# Patient Record
Sex: FEMALE | Race: WHITE | NOT HISPANIC OR LATINO | URBAN - METROPOLITAN AREA
[De-identification: names, ages, dates, MRNs, and addresses within clinical notes are randomized per-mention and may not be internally consistent; named-entity substitution may affect disease eponyms.]

---

## 2020-11-03 ENCOUNTER — HISTORICAL (OUTPATIENT)
Dept: RADIOLOGY | Facility: HOSPITAL | Age: 40
End: 2020-11-03

## 2021-04-26 LAB
ABS NEUT (OLG): 2.68 X10(3)/MCL (ref 2.1–9.2)
ANISOCYTOSIS BLD QL SMEAR: 1
B-HCG FREE SERPL-ACNC: <2.42 MIU/ML
BASOPHILS # BLD AUTO: 0 X10(3)/MCL (ref 0–0.2)
BASOPHILS NFR BLD AUTO: 1 %
DACRYOCYTES BLD QL SMEAR: 1 /MCL (ref 0–1)
EOSINOPHIL # BLD AUTO: 0.2 X10(3)/MCL (ref 0–0.9)
EOSINOPHIL NFR BLD AUTO: 4 %
ERYTHROCYTE [DISTWIDTH] IN BLOOD BY AUTOMATED COUNT: 21 % (ref 11.5–17)
GROUP & RH: NORMAL
HCT VFR BLD AUTO: 26.3 % (ref 37–47)
HGB BLD-MCNC: 7 GM/DL (ref 12–16)
HYPOCHROMIA BLD QL SMEAR: 1
IMM GRANULOCYTES # BLD AUTO: 0.01 10*3/UL
IMM GRANULOCYTES NFR BLD AUTO: 0 %
LYMPHOCYTES # BLD AUTO: 1.1 X10(3)/MCL (ref 0.6–4.6)
LYMPHOCYTES NFR BLD AUTO: 25 %
MACROCYTES BLD QL SMEAR: 1 /MCL
MCH RBC QN AUTO: 17.3 PG (ref 27–31)
MCHC RBC AUTO-ENTMCNC: 26.6 GM/DL (ref 33–36)
MCV RBC AUTO: 65.1 FL (ref 80–94)
MICROCYTES BLD QL SMEAR: 1
MONOCYTES # BLD AUTO: 0.4 X10(3)/MCL (ref 0.1–1.3)
MONOCYTES NFR BLD AUTO: 10 %
NEUTROPHILS # BLD AUTO: 2.68 X10(3)/MCL (ref 2.1–9.2)
NEUTROPHILS NFR BLD AUTO: 60 %
OVALOCYTES BLD QL SMEAR: 1 (ref 0–0)
PLATELET # BLD AUTO: 302 X10(3)/MCL (ref 130–400)
PLATELET # BLD EST: ADEQUATE 10*3/UL
PMV BLD AUTO: ABNORMAL FL (ref 9.4–12.4)
POIKILOCYTOSIS BLD QL SMEAR: 1
RBC # BLD AUTO: 4.04 X10(6)/MCL (ref 4.2–5.4)
RBC MORPH BLD: ABNORMAL
SARS-COV-2 AG RESP QL IA.RAPID: NEGATIVE
WBC # SPEC AUTO: 4.4 X10(3)/MCL (ref 4.5–11.5)

## 2021-04-27 ENCOUNTER — HISTORICAL (OUTPATIENT)
Dept: INFUSION THERAPY | Facility: HOSPITAL | Age: 41
End: 2021-04-27

## 2021-04-27 LAB
CROSSMATCH INTERPRETATION: NORMAL
GROUP & RH: NORMAL
PRODUCT READY: NORMAL
TRANSFUSION ORDER: NORMAL

## 2021-04-28 ENCOUNTER — HISTORICAL (OUTPATIENT)
Dept: SURGERY | Facility: HOSPITAL | Age: 41
End: 2021-04-28

## 2021-04-28 LAB
ANION GAP SERPL CALC-SCNC: 13 MMOL/L
BUN SERPL-MCNC: 8 MG/DL (ref 8–26)
CHLORIDE SERPL-SCNC: 103 MMOL/L (ref 98–109)
CREAT SERPL-MCNC: 0.7 MG/DL (ref 0.6–1.3)
EST CREAT CLEARANCE SER (OHS): 107.19 ML/MIN
GLUCOSE SERPL-MCNC: 88 MG/DL (ref 70–105)
HCT VFR BLD CALC: 29 % (ref 38–51)
HGB BLD-MCNC: 9.9 MG/DL (ref 12–17)
POC BETA-HCG (QUAL): NEGATIVE
POC IONIZED CALCIUM: 1.24 MMOL/L (ref 1.12–1.32)
POC TCO2: 28 MMOL/L (ref 24–29)
POTASSIUM BLD-SCNC: 4.2 MMOL/L (ref 3.5–4.9)
SODIUM BLD-SCNC: 139 MMOL/L (ref 138–146)

## 2022-04-09 ENCOUNTER — HISTORICAL (OUTPATIENT)
Dept: ADMINISTRATIVE | Facility: HOSPITAL | Age: 42
End: 2022-04-09

## 2022-04-27 VITALS
BODY MASS INDEX: 24.02 KG/M2 | DIASTOLIC BLOOD PRESSURE: 78 MMHG | HEIGHT: 63 IN | OXYGEN SATURATION: 100 % | WEIGHT: 135.56 LBS | SYSTOLIC BLOOD PRESSURE: 115 MMHG

## 2024-01-05 ENCOUNTER — OFFICE VISIT (OUTPATIENT)
Dept: GYNECOLOGY | Facility: CLINIC | Age: 44
End: 2024-01-05
Payer: COMMERCIAL

## 2024-01-05 VITALS
TEMPERATURE: 98 F | WEIGHT: 146 LBS | OXYGEN SATURATION: 100 % | BODY MASS INDEX: 26.87 KG/M2 | RESPIRATION RATE: 18 BRPM | DIASTOLIC BLOOD PRESSURE: 80 MMHG | HEIGHT: 62 IN | HEART RATE: 87 BPM | SYSTOLIC BLOOD PRESSURE: 114 MMHG

## 2024-01-05 DIAGNOSIS — Z01.419 ROUTINE GYNECOLOGICAL EXAMINATION: Primary | ICD-10-CM

## 2024-01-05 DIAGNOSIS — R63.5 WEIGHT GAIN: ICD-10-CM

## 2024-01-05 DIAGNOSIS — Z12.31 VISIT FOR SCREENING MAMMOGRAM: ICD-10-CM

## 2024-01-05 PROCEDURE — 3079F DIAST BP 80-89 MM HG: CPT | Mod: CPTII,,, | Performed by: OBSTETRICS & GYNECOLOGY

## 2024-01-05 PROCEDURE — 3074F SYST BP LT 130 MM HG: CPT | Mod: CPTII,,, | Performed by: OBSTETRICS & GYNECOLOGY

## 2024-01-05 PROCEDURE — 1159F MED LIST DOCD IN RCRD: CPT | Mod: CPTII,,, | Performed by: OBSTETRICS & GYNECOLOGY

## 2024-01-05 PROCEDURE — 99396 PREV VISIT EST AGE 40-64: CPT | Mod: S$PBB,,, | Performed by: OBSTETRICS & GYNECOLOGY

## 2024-01-05 PROCEDURE — 99214 OFFICE O/P EST MOD 30 MIN: CPT | Mod: PBBFAC | Performed by: OBSTETRICS & GYNECOLOGY

## 2024-01-05 PROCEDURE — 3008F BODY MASS INDEX DOCD: CPT | Mod: CPTII,,, | Performed by: OBSTETRICS & GYNECOLOGY

## 2024-01-05 PROCEDURE — 1160F RVW MEDS BY RX/DR IN RCRD: CPT | Mod: CPTII,,, | Performed by: OBSTETRICS & GYNECOLOGY

## 2024-01-05 RX ORDER — ONDANSETRON 4 MG/1
8 TABLET, FILM COATED ORAL
COMMUNITY
Start: 2021-05-14

## 2024-01-05 RX ORDER — BUSPIRONE HYDROCHLORIDE 10 MG/1
10 TABLET ORAL 3 TIMES DAILY
COMMUNITY
Start: 2023-12-19

## 2024-01-05 RX ORDER — ALPRAZOLAM 0.5 MG/1
0.5 TABLET ORAL
COMMUNITY
Start: 2023-12-19

## 2024-01-05 RX ORDER — PHENTERMINE HYDROCHLORIDE 37.5 MG/1
37.5 TABLET ORAL
Qty: 30 TABLET | Refills: 0 | Status: SHIPPED | OUTPATIENT
Start: 2024-01-05 | End: 2024-02-04

## 2024-01-05 RX ORDER — BUPROPION HYDROCHLORIDE 300 MG/1
300 TABLET ORAL EVERY MORNING
COMMUNITY
Start: 2023-12-19

## 2024-01-05 RX ORDER — DESVENLAFAXINE 100 MG/1
100 TABLET, EXTENDED RELEASE ORAL
COMMUNITY
Start: 2024-01-04

## 2024-01-05 NOTE — PROGRESS NOTES
Subjective:       Patient ID: Annalise Salcedo is a 43 y.o. female.    Chief Complaint:  Well Woman    History of Present Illness:  Presents for gyn wellness visit without complaints.  Periods are absent. She denies vaginal discharge, breast masses or other changes of concern. Requests meds to assist with weight loss by next month.  We discussed options for long term weight management with following Macros.    GYN & OB History  No LMP recorded (exact date). Patient has had a hysterectomy.   Date of Last Pap: No result found    OB History    Para Term  AB Living   3 2       2   SAB IAB Ectopic Multiple Live Births           2      # Outcome Date GA Lbr Doc/2nd Weight Sex Delivery Anes PTL Lv   3       Vag-Spont   FD   2 Para      Vag-Spont   JONATHAN   1 Para      Vag-Spont   JONATHAN       Past Medical History:   Diagnosis Date    Depression       Past Surgical History:   Procedure Laterality Date    CERVICAL CERCLAGE  10/04/2013    HYSTERECTOMY, TOTAL, LAPAROSCOPIC, WITH SALPINGECTOMY N/A 2021    HYSTEROSCOPY WITH DILATION AND CURETTAGE OF UTERUS  02/15/2019    Laparoscopic Ovariolysis with Ovarian Cystectomy  02/15/2019    LAPAROSCOPY  2004    LAPAROSCOPY          Current Outpatient Medications:     ALPRAZolam (XANAX) 0.5 MG tablet, Take 0.5 mg by mouth as needed., Disp: , Rfl:     buPROPion (WELLBUTRIN XL) 300 MG 24 hr tablet, Take 300 mg by mouth every morning., Disp: , Rfl:     busPIRone (BUSPAR) 10 MG tablet, Take 10 mg by mouth 3 (three) times daily., Disp: , Rfl:     desvenlafaxine succinate (PRISTIQ) 100 MG Tb24, Take 100 mg by mouth., Disp: , Rfl:     ondansetron (ZOFRAN) 4 MG tablet, Take 8 mg by mouth., Disp: , Rfl:     phentermine (ADIPEX-P) 37.5 mg tablet, Take 1 tablet (37.5 mg total) by mouth before breakfast., Disp: 30 tablet, Rfl: 0     Review of patient's allergies indicates:  No Known Allergies     Social History     Socioeconomic History    Marital status: Unknown   Tobacco  Use    Smoking status: Never    Smokeless tobacco: Never   Substance and Sexual Activity    Alcohol use: Not Currently    Drug use: Never    Sexual activity: Yes     Partners: Male     Birth control/protection: See Surgical Hx        Immunization History   Administered Date(s) Administered    COVID-19, MRNA, LN-S, PF (Pfizer) (Purple Cap) 08/09/2021, 09/03/2021    Influenza - Quadrivalent - PF *Preferred* (6 months and older) 10/05/2009        There are no preventive care reminders to display for this patient.     Review of Systems  Review of Systems        Objective:     Vitals:    01/05/24 1049   BP: 114/80   Pulse: 87   Resp: 18   Temp: 98.4 °F (36.9 °C)       Physical Exam:   Constitutional: She is oriented to person, place, and time. She appears well-developed and well-nourished.    HENT:   Head: Normocephalic and atraumatic.    Eyes: Pupils are equal, round, and reactive to light. Conjunctivae and EOM are normal.    Neck: No thyromegaly present.    Cardiovascular:  Normal rate, regular rhythm and normal heart sounds.            No murmur heard.   Pulmonary/Chest: Effort normal and breath sounds normal. She has no wheezes. She has no rales.        Abdominal: Soft. She exhibits no distension and no mass. There is no abdominal tenderness. No hernia.     Genitourinary:    Inguinal canal, right adnexa and left adnexa normal.   Rectum:      No external hemorrhoid.   The external female genitalia was normal.     Labial bartholins normal.There is no lesion on the right labia. There is no lesion on the left labia. Vagina exhibits no lesion. No erythema, vaginal discharge, tenderness, rectocele or cystocele in the vagina. Cervix is absent.Uterus is absent. Normal urethral meatus.Bladder findings: no bladder distention and no bladder tenderness             Lymphadenopathy:     She has no cervical adenopathy.    Neurological: She is alert and oriented to person, place, and time.    Skin: Skin is warm and dry. No rash  noted.    Psychiatric: She has a normal mood and affect. Her behavior is normal.          Assessment:        1. Routine gynecological examination    2. Visit for screening mammogram    3. Weight gain                Plan:      Problem List Items Addressed This Visit    None  Visit Diagnoses       Routine gynecological examination    -  Primary    Visit for screening mammogram        Relevant Orders    Mammo Digital Screening Bilat w/ Mark    Weight gain        Relevant Medications    phentermine (ADIPEX-P) 37.5 mg tablet           Medications Ordered This Encounter   Medications    phentermine (ADIPEX-P) 37.5 mg tablet     Sig: Take 1 tablet (37.5 mg total) by mouth before breakfast.     Dispense:  30 tablet     Refill:  0      No follow-ups on file.   SBE